# Patient Record
Sex: FEMALE | Race: BLACK OR AFRICAN AMERICAN | NOT HISPANIC OR LATINO | Employment: OTHER | URBAN - METROPOLITAN AREA
[De-identification: names, ages, dates, MRNs, and addresses within clinical notes are randomized per-mention and may not be internally consistent; named-entity substitution may affect disease eponyms.]

---

## 2017-04-15 ENCOUNTER — HOSPITAL ENCOUNTER (EMERGENCY)
Facility: HOSPITAL | Age: 62
Discharge: HOME | End: 2017-04-15
Attending: EMERGENCY MEDICINE
Payer: MEDICARE

## 2017-04-15 VITALS
WEIGHT: 126 LBS | HEIGHT: 63 IN | OXYGEN SATURATION: 98 % | TEMPERATURE: 98 F | BODY MASS INDEX: 22.32 KG/M2 | SYSTOLIC BLOOD PRESSURE: 125 MMHG | DIASTOLIC BLOOD PRESSURE: 84 MMHG | HEART RATE: 86 BPM | RESPIRATION RATE: 18 BRPM

## 2017-04-15 DIAGNOSIS — M25.561 RIGHT KNEE PAIN: ICD-10-CM

## 2017-04-15 DIAGNOSIS — S72.444A: Primary | ICD-10-CM

## 2017-04-15 PROCEDURE — 29505 APPLICATION LONG LEG SPLINT: CPT | Mod: RT

## 2017-04-15 PROCEDURE — 99283 EMERGENCY DEPT VISIT LOW MDM: CPT

## 2017-04-15 RX ORDER — HYDROCODONE BITARTRATE AND ACETAMINOPHEN 5; 325 MG/1; MG/1
1 TABLET ORAL EVERY 4 HOURS PRN
Qty: 20 TABLET | Refills: 0 | Status: SHIPPED | OUTPATIENT
Start: 2017-04-15

## 2017-04-15 NOTE — ED AVS SNAPSHOT
[facility]  [address] [place] [address]               Lily Sanchez   4/15/2017  9:07 PM   ED    Description:  Female : 1955   Department:  [facility]           Your Care was Coordinated By:     Provider Role From To    Dontae Agee III, MD Attending Provider 04/15/17 2108 --    Vika Mccarthy NP Nurse Practitioner 04/15/17 2108 --      Reason for Visit     Knee Pain     Abscess           Diagnoses this Visit        Comments    Closed nondisplaced fracture of distal epiphysis of right femur, initial encounter    -  Primary     Right knee pain           ED Disposition     ED Disposition Condition Comment    Discharge             To Do List           Follow-up Information     Follow up with Eran Paz MD.    Specialty:  Orthopedic Surgery    Why:  ORTHOPEDICS - CALL FOR APPOINTMENT FOR RE-EVALUATION OF INJURY WITHIN 1 WEEK    Contact information:    [address]  [address]  [place] [address]  [phone]         These Medications        Disp Refills Start End    hydrocodone-acetaminophen 5-325mg (NORCO) 5-325 mg per tablet 20 tablet 0 4/15/2017     Take 1 tablet by mouth every 4 (four) hours as needed (Breakthrough pain). MAY CAUSE DROWSINESS - Oral      [facility]     [facility] On Call Nurse Care Line - 24/ Assistance  Unless otherwise directed by your provider, please contact [facility], our nurse care line that is available for 24/7 assistance.     Registered nurses in the [facility] provide: appointment scheduling, clinical advisement, health education, and other advisory services.  Call: [phone] (toll free)               Medications           Message regarding Medications     Verify the changes and/or additions to your medication regime listed below are the same as discussed with your clinician today.  If any of these changes or additions are incorrect, please notify your healthcare  "provider.        START taking these NEW medications        Refills    hydrocodone-acetaminophen 5-325mg (NORCO) 5-325 mg per tablet 0    Sig: Take 1 tablet by mouth every 4 (four) hours as needed (Breakthrough pain). MAY CAUSE DROWSINESS    Class: Print    Route: Oral           Verify that the below list of medications is an accurate representation of the medications you are currently taking.  If none reported, the list may be blank. If incorrect, please contact your healthcare provider. Carry this list with you in case of emergency.           Current Medications     hydrocodone-acetaminophen 5-325mg (NORCO) 5-325 mg per tablet Take 1 tablet by mouth every 4 (four) hours as needed (Breakthrough pain). MAY CAUSE DROWSINESS           Clinical Reference Information           Your Vitals Were     BP Pulse Temp Resp Height Weight    125/84 (BP Location: Left arm, Patient Position: Sitting) 86 98.1 °F (36.7 °C) (Oral) 18 5' 3" (1.6 m) 57.2 kg (126 lb)    SpO2 BMI             98% 22.32 kg/m2         Allergies as of 4/15/2017        Reactions    Penicillins       Immunizations Administered on Date of Encounter - 4/15/2017     None      ED Micro, Lab, POCT     None      ED Imaging Orders     Start Ordered       Status Ordering Provider    04/15/17 2115 04/15/17 2115  X-Ray Knee 3 View Right  1 time imaging      Final result         Discharge Instructions       Please wear the knee immobilizer and use crutches (non-weightbearing).    RICE - Rest, Ice, Compress, Elevate (see handout).    Please take medications as prescribed.    You can take Norco for pain, as needed.  Please do not take Norco while working, drinking alcohol, driving/operating heavy machinery, swimming. It may cause drowsiness, impair judgment, and reduce physical capabilities.    Follow up with orthopedics within the next 2-3 days for further evaluation of your injury.    Return to ER for any new or worsening symptoms.      Discharge References/Attachments  "    FRACTURE, KNEE (ENGLISH)    CRUTCHES (NON-WEIGHT-BEARING), DISCHARGE INSTRUCTIONS (ENGLISH)    KNEE IMMOBILIZER (ENGLISH)      [facility] Sign-Up     Activating your [facility] account is as easy as 1-2-3!     1) Visit [email or web address], select Sign Up Now, enter this activation code and your date of birth, then select Next.  [email or web address]  Expires: 5/30/2017 10:02 PM      2) Create a username and password to use when you visit [facility] in the future and select a security question in case you lose your password and select Next.    3) Enter your e-mail address and click Sign Up!    Additional Information  If you have questions, please e-mail [email or web address] or call [phone] to talk to our [facility] staff. Remember, [facility] is NOT to be used for urgent needs. For medical emergencies, dial 911.         Smoking Cessation     If you would like to quit smoking:   You may be eligible for free services if you are a [place] resident and started smoking cigarettes before September 1, 1988.  Call the Smoking Cessation [employer] (SCT) toll free at (624) 284-2187 or (954) 268-7417.   Call 1-800-QUIT-NOW if you do not meet the above criteria.   Contact us via email: [email or web address]   View our website for more information: [email or web address]         [facility] complies with applicable Federal civil rights laws and does not discriminate on the basis of race, color, national origin, age, disability, or sex.        Language Assistance Services     ATTENTION: Language assistance services are available, free of charge. Please call [phone].      ATENCIÓN: Si habla español, tiene a mancilla disposición servicios gratuitos de asistencia lingüística. Llame al [phone].     CHÚ Ý: N?u b?n nói Ti?ng Vi?t, có các d?ch v? h? tr? ngôn ng? mi?n phí dành cho b?n. G?i s? [phone].

## 2017-04-16 NOTE — ED PROVIDER NOTES
"Encounter Date: 4/15/2017    SCRIBE #1 NOTE: I, Damian Chaudhry, am scribing for, and in the presence of,  Vika Mccarthy NP. I have scribed the following portions of the note - Other sections scribed: TORRIE, HPI.       History     Chief Complaint   Patient presents with    Knee Pain     Right knee pain and swelling after knee popped 2 weeks ago. Continues to swell and be painful.    Abscess     right side of jaw, draining pus     Review of patient's allergies indicates:   Allergen Reactions    Penicillins      HPI Comments: CC: Abscess    HPI: This 61 y.o. F, who has a history of polio and HTN that comes to the ED for evaluation of an abscess to the right chin x 2 weeks. She has been taking antibiotics x 10 days prescribed by her PCP and swelling and redness is improving. She denies fever, dental pain, nausea and vomiting. She is also c/o right knee tightness and swelling x2 weeks. Pain began acutely while walking down steps. Pt reports hearing a "pop" and immediately struggled to bear weight and walk. Pain is worse with weightbearing and flexion. She has been treating with Witjohn and "green alcohol;" swelling is improving. She denies ankle pain, hip pain, numbness, fever, nausea and vomiting.     The history is provided by the patient.     History reviewed. No pertinent past medical history.  History reviewed. No pertinent surgical history.  History reviewed. No pertinent family history.  Social History   Substance Use Topics    Smoking status: Current Every Day Smoker     Types: Cigars    Smokeless tobacco: None    Alcohol use Yes     Review of Systems   Constitutional: Negative for fever.   Gastrointestinal: Negative for nausea and vomiting.   Musculoskeletal:        (+) Right knee pain  (-) Hip and ankle pain   Skin:        (+) Abscess to right buccal region   Neurological: Negative for numbness.       Physical Exam   Initial Vitals   BP Pulse Resp Temp SpO2   04/15/17 1926 04/15/17 1926 04/15/17 1926 " 04/15/17 1926 04/15/17 1926   125/84 86 18 98.1 °F (36.7 °C) 98 %     Physical Exam    Nursing note and vitals reviewed.  Constitutional: Vital signs are normal. She appears well-developed and well-nourished. She is not diaphoretic. She is active and cooperative. She does not appear ill. No distress.   HENT:   Head:       Eyes: Conjunctivae and EOM are normal. Pupils are equal, round, and reactive to light.   Neck: Normal range of motion. Neck supple.   Pulmonary/Chest: Effort normal. No respiratory distress.   Musculoskeletal: Normal range of motion.        Right knee: She exhibits swelling (moderate diffuse) and effusion. She exhibits normal range of motion, no ecchymosis, no deformity, no laceration, no erythema, normal alignment, no LCL laxity and no MCL laxity. No tenderness found.   No varus or valgus deformity or laxity noted to RLE.   Neurological: She is alert and oriented to person, place, and time. She has normal strength.   Skin: Skin is warm and dry.   Psychiatric: She has a normal mood and affect.         ED Course   Orthopedic Injury  Date/Time: 4/15/2017 9:30 PM  Authorized by: DRE DE LA GARZA III   Performed by: CORINE SARAVIA  Injury location: knee  Location details: right knee  Injury type: fracture (Distal femur)  Pre-procedure distal perfusion: normal  Pre-procedure neurological function: normal  Pre-procedure neurovascular assessment: neurovascularly intact  Pre-procedure range of motion: normal  Local anesthesia used: no    Anesthesia:  Local anesthesia used: no  Sedation:  Patient sedated: no    Immobilization: Knee immobilizer.  Complications: No  Post-procedure neurovascular assessment: post-procedure neurovascularly intact  Post-procedure distal perfusion: normal  Post-procedure neurological function: normal  Post-procedure range of motion: unchanged  Patient tolerance: Patient tolerated the procedure well with no immediate complications        Labs Reviewed - No data to display       X-Rays:    Independently Interpreted Readings:   Other Readings:  X-ray right knee with minimally displaced fracture through the medial distal femur metaphysis.  Subluxation of the right patella.  No dislocation.       Additional MDM:   Comments: This is an urgent evaluation of a 61-year-old female that presents the emergency room with multiple medical complaints.    Patient reports a facial abscess to the right chin that has been draining.  Symptoms first started 2 weeks ago and she has been seen by her primary care doctor and has finished antibiotics course.  Patient reports that she continues to keep the area clean and applies black salve daily.  Exam reveals a 1 cm lesion with central opening with scant purulent drainage.  There is no fluctuance, induration, erythema, or surrounding cellulitis.  Patient is otherwise nontoxic appearing with no evidence to support systemic infection or sepsis.  No indication for I&D today.  I advised patient to continue using warm compresses over the affected area and to keep it clean and dry.  To follow-up with her PCP.    Patient also reports a two-week history of right knee pain; she has a history of polio and reports prior surgeries to this knee.  Patient states that she was walking down a flight of stairs and felt a pop, but never had blunt force trauma.  Exam significant for moderate and diffuse knee swelling with effusion.  She otherwise has full range of motion and no appreciable tenderness to palpation.  There is no laxity or joint instability.  She has been weightbearing, with a slight limp.  Her distal pulses are intact.  Compartments are soft.  X-ray reveals a distal femoral fracture at the metaphysis with a patellar subluxation.  There is no dislocation.  Based on her exam today, I see no indication for CT imaging or emergent orthopedics consult.  Patient has been walking on the affected extremity for 2 weeks and there is no acute displacement.  There is no neurovascular  deficits.  I believe she is stable for outpatient evaluation.  A knee immobilizer is placed and patient has crutches at home.  I strongly advised her to use crutches and she is to be nonweightbearing on the right leg.  Rx Norco for supportive care.  Encouraged RICE and ortho follow up within 2-3 days for re-evaluation of injury.  Return precautions.    Case discussed with attending, , and he is in agreement with plan. Stable for discharge and outpatient follow.  .          Scribe Attestation:   Scribe #1: I performed the above scribed service and the documentation accurately describes the services I performed. I attest to the accuracy of the note.    Attending Attestation:     Physician Attestation Statement for NP/PA:   I discussed this assessment and plan of this patient with the NP/PA, but I did not personally examine the patient. The face to face encounter was performed by the NP/PA.    Other NP/PA Attestation Additions:      Medical Decision Makin-year-old female presents with multiple complaints including facial abscess to the right chin and 2 weeks of right knee pain.  Patient does reveal a small abscess with scant drainage.  Patient also has swelling of the knee, with no other deformity and no neurovascular compromise.    I have personally reviewed and interpreted the patient's right knee x-ray and there is a distal femur fracture.     Interestingly, despite her fracture the patient has been ambulating for the last 2 weeks.  There is no sign for concerning compartment syndrome, bleeding, or severe displacement, so will not emergently consult orthopedics.  Will instruct the patient to be nonweightbearing and recommend follow-up with orthopedics for further evaluation.       Physician Attestation for Scribe:  Physician Attestation Statement for Scribe #1: I, Vika Mccarthy, ERICA, reviewed documentation, as scribed by Damian Chaudhry in my presence, and it is both accurate and complete.                 ED  Course     Clinical Impression:   The primary encounter diagnosis was Closed nondisplaced fracture of distal epiphysis of right femur, initial encounter. A diagnosis of Right knee pain was also pertinent to this visit.    Disposition:   Disposition: Discharged  Condition: Stable       Vika Mccarthy NP  04/15/17 1521       Dontae Agee III, MD  04/30/17 4134

## 2017-04-16 NOTE — ED TRIAGE NOTES
"Pt reports with Rt knee "popped" 2 weeks ago.  Noted swelling with limited movement denies any pain at this time.   Noted abscess to Rt lower chin area.      "

## 2017-04-16 NOTE — DISCHARGE INSTRUCTIONS
Please wear the knee immobilizer and use crutches (non-weightbearing).    RICE - Rest, Ice, Compress, Elevate (see handout).    Please take medications as prescribed.    You can take Norco for pain, as needed.  Please do not take Norco while working, drinking alcohol, driving/operating heavy machinery, swimming. It may cause drowsiness, impair judgment, and reduce physical capabilities.    Follow up with orthopedics within the next 2-3 days for further evaluation of your injury.    Return to ER for any new or worsening symptoms.

## 2017-06-07 DIAGNOSIS — M81.0 OP (OSTEOPOROSIS): Primary | ICD-10-CM

## 2024-07-08 ENCOUNTER — TELEPHONE (OUTPATIENT)
Dept: OPTOMETRY | Facility: CLINIC | Age: 69
End: 2024-07-08
Payer: MEDICARE